# Patient Record
Sex: MALE | Race: WHITE | Employment: STUDENT | URBAN - METROPOLITAN AREA
[De-identification: names, ages, dates, MRNs, and addresses within clinical notes are randomized per-mention and may not be internally consistent; named-entity substitution may affect disease eponyms.]

---

## 2018-05-08 ENCOUNTER — HOSPITAL ENCOUNTER (OUTPATIENT)
Dept: SURGERY | Age: 18
Discharge: HOME | End: 2018-05-08

## 2018-05-09 VITALS — HEIGHT: 69 IN | WEIGHT: 150 LBS | BODY MASS INDEX: 22.22 KG/M2

## 2018-05-09 NOTE — PERIOP NOTES
Patient verified name, , and surgery as listed in [facility]. Type 1B surgery, phone assessment complete. Orders not received. Labs per surgeon: no orders in EMR at this time  Labs per anesthesia protocol: none    Pt reports hx of heart murmur diagnosis by [facility] clinic approx 2 yrs ago. Pt states he does not have a PCP nor did he ever follow-up with this possible diagnosis. Will have anesthesia review per protocol. Patient answered medical/surgical history questions at their best of ability. All prior to admission medications documented in [facility]. Patient instructed to take the following medications the day of surgery according to anesthesia guidelines with a small sip of water: none. Hold all vitamins 7 days prior to surgery and NSAIDS 5 days prior to surgery. Medications to be held- nsaids and vitamins. Patient instructed on the following (verbal instructions given as well as emailed instructions per pt request):  Arrive at A Entrance, time of arrival to be called the day before by 1700. NPO after midnight including gum, mints, and ice chips. Responsible adult must drive patient to the hospital, stay during surgery, and patient will need supervision 24 hours after anesthesia. Use antibacterial soap in shower the night before surgery and on the morning of surgery. Leave all valuables (money and jewelry) at home but bring insurance card and ID on DOS. Do not wear make-up, nail polish, lotions, cologne, perfumes, powders, or oil on skin. Patient teach back successful and patient demonstrates knowledge of instruction.

## 2018-05-13 ENCOUNTER — ANESTHESIA EVENT (OUTPATIENT)
Dept: SURGERY | Age: 18
End: 2018-05-13
Payer: COMMERCIAL

## 2018-05-14 ENCOUNTER — ANESTHESIA (OUTPATIENT)
Dept: SURGERY | Age: 18
End: 2018-05-14
Payer: COMMERCIAL

## 2018-05-14 ENCOUNTER — HOSPITAL ENCOUNTER (OUTPATIENT)
Age: 18
Setting detail: OUTPATIENT SURGERY
Discharge: HOME | End: 2018-05-14
Attending: OTOLARYNGOLOGY | Admitting: OTOLARYNGOLOGY
Payer: COMMERCIAL

## 2018-05-14 VITALS
HEART RATE: 50 BPM | TEMPERATURE: 97.6 F | OXYGEN SATURATION: 99 % | RESPIRATION RATE: 17 BRPM | DIASTOLIC BLOOD PRESSURE: 65 MMHG | SYSTOLIC BLOOD PRESSURE: 122 MMHG

## 2018-05-14 PROCEDURE — 74011250637 HC RX REV CODE- 250/637: Performed by: ANESTHESIOLOGY

## 2018-05-14 PROCEDURE — 77030018836 HC SOL IRR NACL ICUM -A: Performed by: OTOLARYNGOLOGY

## 2018-05-14 PROCEDURE — 77030008477 HC STYL SATN SLP COVD -A: Performed by: NURSE ANESTHETIST, CERTIFIED REGISTERED

## 2018-05-14 PROCEDURE — 77030012840 HC ELECTRD COAG SUC CNMD -C: Performed by: OTOLARYNGOLOGY

## 2018-05-14 PROCEDURE — 74011250636 HC RX REV CODE- 250/636

## 2018-05-14 PROCEDURE — 74011000250 HC RX REV CODE- 250: Performed by: ANESTHESIOLOGY

## 2018-05-14 PROCEDURE — 74011250636 HC RX REV CODE- 250/636: Performed by: ANESTHESIOLOGY

## 2018-05-14 PROCEDURE — 77030011267 HC ELECTRD BLD COVD -A: Performed by: OTOLARYNGOLOGY

## 2018-05-14 PROCEDURE — 77030020782 HC GWN BAIR PAWS FLX 3M -B: Performed by: NURSE ANESTHETIST, CERTIFIED REGISTERED

## 2018-05-14 PROCEDURE — 76060000032 HC ANESTHESIA 0.5 TO 1 HR: Performed by: OTOLARYNGOLOGY

## 2018-05-14 PROCEDURE — 77030008703 HC TU ET UNCUF COVD -A: Performed by: NURSE ANESTHETIST, CERTIFIED REGISTERED

## 2018-05-14 PROCEDURE — 77030011640 HC PAD GRND REM COVD -A: Performed by: OTOLARYNGOLOGY

## 2018-05-14 PROCEDURE — 76010000138 HC OR TIME 0.5 TO 1 HR: Performed by: OTOLARYNGOLOGY

## 2018-05-14 PROCEDURE — 74011000250 HC RX REV CODE- 250

## 2018-05-14 PROCEDURE — 76210000006 HC OR PH I REC 0.5 TO 1 HR: Performed by: OTOLARYNGOLOGY

## 2018-05-14 RX ORDER — ROCURONIUM BROMIDE 10 MG/ML
INJECTION, SOLUTION INTRAVENOUS AS NEEDED
Status: DISCONTINUED | OUTPATIENT
Start: 2018-05-14 | End: 2018-05-14 | Stop reason: HOSPADM

## 2018-05-14 RX ORDER — OXYCODONE HYDROCHLORIDE 5 MG/1
5 TABLET ORAL
Status: DISCONTINUED | OUTPATIENT
Start: 2018-05-14 | End: 2018-05-14 | Stop reason: HOSPADM

## 2018-05-14 RX ORDER — HYDROMORPHONE HYDROCHLORIDE 2 MG/ML
0.5 INJECTION, SOLUTION INTRAMUSCULAR; INTRAVENOUS; SUBCUTANEOUS
Status: DISCONTINUED | OUTPATIENT
Start: 2018-05-14 | End: 2018-05-14 | Stop reason: HOSPADM

## 2018-05-14 RX ORDER — SODIUM CHLORIDE 9 MG/ML
25 INJECTION, SOLUTION INTRAVENOUS CONTINUOUS
Status: DISCONTINUED | OUTPATIENT
Start: 2018-05-14 | End: 2018-05-14 | Stop reason: HOSPADM

## 2018-05-14 RX ORDER — HYDROCODONE BITARTRATE AND ACETAMINOPHEN 7.5; 325 MG/1; MG/1
1 TABLET ORAL AS NEEDED
Status: DISCONTINUED | OUTPATIENT
Start: 2018-05-14 | End: 2018-05-14 | Stop reason: HOSPADM

## 2018-05-14 RX ORDER — MIDAZOLAM HYDROCHLORIDE 1 MG/ML
2 INJECTION, SOLUTION INTRAMUSCULAR; INTRAVENOUS
Status: DISCONTINUED | OUTPATIENT
Start: 2018-05-14 | End: 2018-05-14 | Stop reason: HOSPADM

## 2018-05-14 RX ORDER — SODIUM CHLORIDE 0.9 % (FLUSH) 0.9 %
5-10 SYRINGE (ML) INJECTION AS NEEDED
Status: DISCONTINUED | OUTPATIENT
Start: 2018-05-14 | End: 2018-05-14 | Stop reason: HOSPADM

## 2018-05-14 RX ORDER — ONDANSETRON 2 MG/ML
INJECTION INTRAMUSCULAR; INTRAVENOUS AS NEEDED
Status: DISCONTINUED | OUTPATIENT
Start: 2018-05-14 | End: 2018-05-14 | Stop reason: HOSPADM

## 2018-05-14 RX ORDER — LIDOCAINE HYDROCHLORIDE 20 MG/ML
INJECTION, SOLUTION EPIDURAL; INFILTRATION; INTRACAUDAL; PERINEURAL AS NEEDED
Status: DISCONTINUED | OUTPATIENT
Start: 2018-05-14 | End: 2018-05-14 | Stop reason: HOSPADM

## 2018-05-14 RX ORDER — LIDOCAINE HYDROCHLORIDE 10 MG/ML
0.1 INJECTION INFILTRATION; PERINEURAL AS NEEDED
Status: DISCONTINUED | OUTPATIENT
Start: 2018-05-14 | End: 2018-05-14 | Stop reason: HOSPADM

## 2018-05-14 RX ORDER — SODIUM CHLORIDE, SODIUM LACTATE, POTASSIUM CHLORIDE, CALCIUM CHLORIDE 600; 310; 30; 20 MG/100ML; MG/100ML; MG/100ML; MG/100ML
100 INJECTION, SOLUTION INTRAVENOUS CONTINUOUS
Status: DISCONTINUED | OUTPATIENT
Start: 2018-05-14 | End: 2018-05-14 | Stop reason: HOSPADM

## 2018-05-14 RX ORDER — NALOXONE HYDROCHLORIDE 0.4 MG/ML
0.1 INJECTION, SOLUTION INTRAMUSCULAR; INTRAVENOUS; SUBCUTANEOUS AS NEEDED
Status: DISCONTINUED | OUTPATIENT
Start: 2018-05-14 | End: 2018-05-14 | Stop reason: HOSPADM

## 2018-05-14 RX ORDER — FENTANYL CITRATE 50 UG/ML
INJECTION, SOLUTION INTRAMUSCULAR; INTRAVENOUS AS NEEDED
Status: DISCONTINUED | OUTPATIENT
Start: 2018-05-14 | End: 2018-05-14 | Stop reason: HOSPADM

## 2018-05-14 RX ORDER — SODIUM CHLORIDE 0.9 % (FLUSH) 0.9 %
5-10 SYRINGE (ML) INJECTION EVERY 8 HOURS
Status: DISCONTINUED | OUTPATIENT
Start: 2018-05-14 | End: 2018-05-14 | Stop reason: HOSPADM

## 2018-05-14 RX ORDER — FAMOTIDINE 20 MG/1
20 TABLET, FILM COATED ORAL ONCE
Status: COMPLETED | OUTPATIENT
Start: 2018-05-14 | End: 2018-05-14

## 2018-05-14 RX ORDER — SUCCINYLCHOLINE CHLORIDE 20 MG/ML
INJECTION INTRAMUSCULAR; INTRAVENOUS AS NEEDED
Status: DISCONTINUED | OUTPATIENT
Start: 2018-05-14 | End: 2018-05-14 | Stop reason: HOSPADM

## 2018-05-14 RX ORDER — DEXAMETHASONE SODIUM PHOSPHATE 4 MG/ML
INJECTION, SOLUTION INTRA-ARTICULAR; INTRALESIONAL; INTRAMUSCULAR; INTRAVENOUS; SOFT TISSUE AS NEEDED
Status: DISCONTINUED | OUTPATIENT
Start: 2018-05-14 | End: 2018-05-14 | Stop reason: HOSPADM

## 2018-05-14 RX ORDER — FENTANYL CITRATE 50 UG/ML
100 INJECTION, SOLUTION INTRAMUSCULAR; INTRAVENOUS ONCE
Status: DISCONTINUED | OUTPATIENT
Start: 2018-05-14 | End: 2018-05-14 | Stop reason: HOSPADM

## 2018-05-14 RX ORDER — PROPOFOL 10 MG/ML
INJECTION, EMULSION INTRAVENOUS AS NEEDED
Status: DISCONTINUED | OUTPATIENT
Start: 2018-05-14 | End: 2018-05-14 | Stop reason: HOSPADM

## 2018-05-14 RX ADMIN — ROCURONIUM BROMIDE 5 MG: 10 INJECTION, SOLUTION INTRAVENOUS at 13:13

## 2018-05-14 RX ADMIN — LIDOCAINE HYDROCHLORIDE 0.1 ML: 10 INJECTION, SOLUTION INFILTRATION; PERINEURAL at 11:55

## 2018-05-14 RX ADMIN — HYDROMORPHONE HYDROCHLORIDE 0.5 MG: 2 INJECTION, SOLUTION INTRAMUSCULAR; INTRAVENOUS; SUBCUTANEOUS at 14:14

## 2018-05-14 RX ADMIN — LIDOCAINE HYDROCHLORIDE 80 MG: 20 INJECTION, SOLUTION EPIDURAL; INFILTRATION; INTRACAUDAL; PERINEURAL at 13:13

## 2018-05-14 RX ADMIN — PROPOFOL 200 MG: 10 INJECTION, EMULSION INTRAVENOUS at 13:13

## 2018-05-14 RX ADMIN — FAMOTIDINE 20 MG: 20 TABLET ORAL at 11:55

## 2018-05-14 RX ADMIN — SUCCINYLCHOLINE CHLORIDE 140 MG: 20 INJECTION INTRAMUSCULAR; INTRAVENOUS at 13:13

## 2018-05-14 RX ADMIN — HYDROMORPHONE HYDROCHLORIDE 0.5 MG: 2 INJECTION, SOLUTION INTRAMUSCULAR; INTRAVENOUS; SUBCUTANEOUS at 14:00

## 2018-05-14 RX ADMIN — FENTANYL CITRATE 100 MCG: 50 INJECTION, SOLUTION INTRAMUSCULAR; INTRAVENOUS at 13:13

## 2018-05-14 RX ADMIN — DEXAMETHASONE SODIUM PHOSPHATE 10 MG: 4 INJECTION, SOLUTION INTRA-ARTICULAR; INTRALESIONAL; INTRAMUSCULAR; INTRAVENOUS; SOFT TISSUE at 13:29

## 2018-05-14 RX ADMIN — HYDROMORPHONE HYDROCHLORIDE 0.5 MG: 2 INJECTION, SOLUTION INTRAMUSCULAR; INTRAVENOUS; SUBCUTANEOUS at 13:51

## 2018-05-14 RX ADMIN — ONDANSETRON 4 MG: 2 INJECTION INTRAMUSCULAR; INTRAVENOUS at 13:29

## 2018-05-14 RX ADMIN — SODIUM CHLORIDE, SODIUM LACTATE, POTASSIUM CHLORIDE, AND CALCIUM CHLORIDE 100 ML/HR: 600; 310; 30; 20 INJECTION, SOLUTION INTRAVENOUS at 11:55

## 2018-05-14 NOTE — ANESTHESIA PREPROCEDURE EVALUATION
Anesthetic History               Review of Systems / Medical History  Patient summary reviewed    Pulmonary                   Neuro/Psych              Cardiovascular      Valvular problems/murmurs (? murmur in past)            Exercise tolerance: >4 METS     GI/Hepatic/Renal                Endo/Other             Other Findings              Physical Exam    Airway  Mallampati: II  TM Distance: > 6 cm  Neck ROM: normal range of motion   Mouth opening: Normal     Cardiovascular  Regular rate and rhythm,  S1 and S2 normal,  no murmur, click, rub, or gallop          Pertinent negatives: No murmur   Dental  No notable dental hx       Pulmonary  Breath sounds clear to auscultation               Abdominal         Other Findings            Anesthetic Plan    ASA: 1  Anesthesia type: general            Anesthetic plan and risks discussed with: Patient

## 2018-05-14 NOTE — IP AVS SNAPSHOT
Summary of Care Report The Summary of Care report has been created to help improve care coordination. Users with access to [employer] or [address] (Web-based application) may access additional patient information including the Discharge Summary. If you are not currently a [address] user and need more information, please call the number listed below in the [place] [address] section and ask to be connected with Medical Records. Facility Information Name Address Phone [id number] [place] [address] Road [address] [place] [address] [phone] Patient Information Patient Name Sex BELKIS Maza ([id number]) Male 2000 Discharge Information Admitting Provider Service Area Unit Vernon Medeiros DO / [facility] / [phone] Discharge Provider Discharge Date/Time Discharge Disposition Destination (none) (none) (none) (none) Patient Language Language ENGLISH [13] Hospital Problems as of 2018  Reviewed: 2018 12:30 PM by Vernon Medeiros DO None Non-Hospital Problems as of 2018  Reviewed: 2018 12:30 PM by Vernon Medeiros DO Class Noted - Resolved Last Modified Active Problems Tonsillitis, chronic  Unknown - Present 10/17/2016 by Meryle Dupre Entered by Meryle Dupre Ill-defined condition  Unknown - Present 10/17/2016 by Meryle Dupre Entered by Meryle Dupre Overview Signed 10/17/2016 11:34 AM by Meryle Dupre  
   recurrent strep throat Hypertrophy of tonsil  Unknown - Present 10/17/2016 by Meryle Dupre Entered by Meryle Dupre Deviated septum  Unknown - Present 10/17/2016 by Meryle Dupre Entered by Meryle Dupre You are allergic to the following Allergen Reactions Cefdinir Rash Cephalexin Rash Not allergic to this. Current Discharge Medication List  
  
ASK your doctor about these medications Dose & Instructions Dispensing Information Comments HYDROcodone-acetaminophen 0.5-21.7 mg/mL oral solution Commonly known as:  HYCET Take 2-4 teaspoons every 4-6 hours prn pain Quantity:  240 mL Refills:  0  
   
 ondansetron 4 mg disintegrating tablet Commonly known as:  ZOFRAN ODT Dose:  4 mg Take 1 Tab by mouth every eight (8) hours as needed for Nausea. Quantity:  8 Tab Refills:  0  
   
 prednisoLONE 15 mg/5 mL (3 mg/mL) solution Commonly known as:  [address] Take 1 teaspoon every morning for 5 days Quantity:  35 mL Refills:  0 Surgery Information ID Date/Time Status Primary Surgeon All Procedures Location [id number] 5/14/2018 1250 Unposted Richie Charter Adolfo, DO TONSILLECTOMY [facility] MAIN OR Follow-up Information None Discharge Instructions Tonsillectomy/Adenoidectomy Post-op Teaching For Ages 9 and Above Following your child's tonsillectomy/adenoidectomy there are several things that often occur. For three to six days after this surgery, your child may seem a little worse each day. This is a common problem for children after this surgery. You will be giving them pain medicine but they just will not feel well and they will not want much to eat or drink. BLEEDING: If your child bleeds after the surgery, it is important that you contact Dr. Dre Marino immediately. Fortunately, only a small number of children do this/ If you do not get an immediate response, go to the emergency room. You do not need to do this  If it is just blood-streaked spit. The bleeding typically occurs seven to ten days after surgery. DRINK PLENTY OF FLUIDS: 
Your child may become mildly dehydrated.  Your goal is to make sure that this dehydration does not become serious enough that the child needs to have medical attention. Continuously offer your child small frequent sips of beverages. This will not be easy because will not feel like eating or drinking. If you think your child is not getting enough liquid, please call our office. DIET: 
Your child will heal faster with good nutrition. Crunchy foods such as chips, popcorn, nuts, hard candy, etc. should be avoided for two weeks after surgery/ If your child only had an adenoidectomy, there is no diet restriction. FEVER: 
More than likely there will be a postoperative fever. It is not unusual for a child to run a 101 or even a 102 fever for one to three days after the surgery. The Leellen Part has half of the normal dose of Tylenol so you can give half the child's usual dose of liquid Tylenol with the Norco if there is a fever after surgery. BAD BREATH: 
Your child is going to have bad breath for 7-10 days after surgery. The healing membrane over the operative area has a very strong odor. If you look in the back of the throat you will see this and it looks very much like a bad case of strep-throat, but it is not an infection. EAR PAIN: 
Your child may develop ear pain. In some children, the ear pain can be quite uncomfortable. This is a referred pain from having the tonsils removed and it is not a sign of an ear infection. TONGUE SWELLING: 
Your child will experience some tongue swelling after the surgery. This is a side effect of the instrument that we use to hold the tongue out of the way during surgery. This is not a serious problem and goes away in a few days. GUM CHEWING: 
This stretches the area where the tonsils or adenoids were removed and some children have less pain with gum chewing. ASPIRIN: 
DO NOT give your child ANY ASPIRIN for at least 3 weeks after surgery. ACTIVITY: 
Have your child remain less active for two weeks after surgery. Avoid rough play, P.E., or sports. After general anesthesia or intravenous sedation, for 24 hours or while taking prescription Narcotics: · Limit your activities · Do not drive and operate hazardous machinery · Do not make important personal or business decisions · Do  not drink alcoholic beverages · If you have not urinated within 8 hours after discharge, please contact your surgeon on call. *  Please give a list of your current medications to your Primary Care Provider. *  Please update this list whenever your medications are discontinued, doses are 
    changed, or new medications (including over-the-counter products) are added. *  Please carry medication information at all times in case of emergency situations. These are general instructions for a healthy lifestyle: No smoking/ No tobacco products/ Avoid exposure to second hand smoke Surgeon General's Warning:  Quitting smoking now greatly reduces serious risk to your health. Obesity, smoking, and sedentary lifestyle greatly increases your risk for illness A healthy diet, regular physical exercise & weight monitoring are important for maintaining a healthy lifestyle You may be retaining fluid if you have a history of heart failure or if you experience any of the following symptoms:  Weight gain of 3 pounds or more overnight or 5 pounds in a week, increased swelling in our hands or feet or shortness of breath while lying flat in bed. Please call your doctor as soon as you notice any of these symptoms; do not wait until your next office visit. Recognize signs and symptoms of STROKE: 
F-face looks uneven A-arms unable to move or move unevenly S-speech slurred or non-existent T-time-call 911 as soon as signs and symptoms begin-DO NOT go Back to bed or wait to see if you get better-TIME IS BRAIN. Chart Review Routing History No Routing History on File

## 2018-05-14 NOTE — DISCHARGE INSTRUCTIONS
Tonsillectomy/Adenoidectomy Post-op Teaching  For Ages 9 and Above    Following your child's tonsillectomy/adenoidectomy there are several things that often occur. For three to six days after this surgery, your child may seem a little worse each day. This is a common problem for children after this surgery. You will be giving them pain medicine but they just will not feel well and they will not want much to eat or drink. BLEEDING:    If your child bleeds after the surgery, it is important that you contact Dr. Jg Monge immediately. Fortunately, only a small number of children do this/ If you do not get an immediate response, go to the emergency room. You do not need to do this  If it is just blood-streaked spit. The bleeding typically occurs seven to ten days after surgery. DRINK PLENTY OF FLUIDS:  Your child may become mildly dehydrated. Your goal is to make sure that this dehydration does not become serious enough that the child needs to have medical attention. Continuously offer your child small frequent sips of beverages. This will not be easy because will not feel like eating or drinking. If you think your child is not getting enough liquid, please call our office. DIET:  Your child will heal faster with good nutrition. Crunchy foods such as chips, popcorn, nuts, hard candy, etc. should be avoided for two weeks after surgery/ If your child only had an adenoidectomy, there is no diet restriction. FEVER:  More than likely there will be a postoperative fever. It is not unusual for a child to run a 101 or even a 102 fever for one to three days after the surgery. The [place] Snellen has half of the normal dose of Tylenol so you can give half the child's usual dose of liquid Tylenol with the Norco if there is a fever after surgery. BAD BREATH:  Your child is going to have bad breath for 7-10 days after surgery. The healing membrane over the operative area has a very strong odor.  If you look in the back of the throat you will see this and it looks very much like a bad case of strep-throat, but it is not an infection. EAR PAIN:  Your child may develop ear pain. In some children, the ear pain can be quite uncomfortable. This is a referred pain from having the tonsils removed and it is not a sign of an ear infection. TONGUE SWELLING:  Your child will experience some tongue swelling after the surgery. This is a side effect of the instrument that we use to hold the tongue out of the way during surgery. This is not a serious problem and goes away in a few days. GUM CHEWING:  This stretches the area where the tonsils or adenoids were removed and some children have less pain with gum chewing. ASPIRIN:  DO NOT give your child ANY ASPIRIN for at least 3 weeks after surgery. ACTIVITY:  Have your child remain less active for two weeks after surgery. Avoid rough play, P.E., or sports. After general anesthesia or intravenous sedation, for 24 hours or while taking prescription Narcotics:  · Limit your activities  · Do not drive and operate hazardous machinery  · Do not make important personal or business decisions  · Do  not drink alcoholic beverages  · If you have not urinated within 8 hours after discharge, please contact your surgeon on call. *  Please give a list of your current medications to your Primary Care Provider. *  Please update this list whenever your medications are discontinued, doses are      changed, or new medications (including over-the-counter products) are added. *  Please carry medication information at all times in case of emergency situations. These are general instructions for a healthy lifestyle:  No smoking/ No tobacco products/ Avoid exposure to second hand smoke  Surgeon General's Warning:  Quitting smoking now greatly reduces serious risk to your health.   Obesity, smoking, and sedentary lifestyle greatly increases your risk for illness  A healthy diet, regular physical exercise & weight monitoring are important for maintaining a healthy lifestyle    You may be retaining fluid if you have a history of heart failure or if you experience any of the following symptoms:  Weight gain of 3 pounds or more overnight or 5 pounds in a week, increased swelling in our hands or feet or shortness of breath while lying flat in bed. Please call your doctor as soon as you notice any of these symptoms; do not wait until your next office visit. Recognize signs and symptoms of STROKE:  F-face looks uneven  A-arms unable to move or move unevenly  S-speech slurred or non-existent  T-time-call 911 as soon as signs and symptoms begin-DO NOT go       Back to bed or wait to see if you get better-TIME IS BRAIN.

## 2018-05-14 NOTE — ANESTHESIA POSTPROCEDURE EVALUATION
Post-Anesthesia Evaluation and Assessment    Patient: Brian Dumont MRN: [id number]  SSN: [id number]    YOB: 2000  Age: 21 y.o. Sex: male       Cardiovascular Function/Vital Signs  Visit Vitals    /70 (BP 1 Location: Left arm, BP Patient Position: Head of bed elevated (Comment degrees))    Pulse (!) 55    Temp 36.4 °C (97.6 °F)    Resp 16    SpO2 97%       Patient is status post general anesthesia for Procedure(s):  TONSILLECTOMY. Nausea/Vomiting: None    Postoperative hydration reviewed and adequate. Pain:  Pain Scale 1: Numeric (0 - 10) (05/14/18 1414)  Pain Intensity 1: 5 (05/14/18 1414)   Managed    Neurological Status:   Neuro (WDL): Exceptions to WDL (05/14/18 1344)  Neuro  Orientation Level: Oriented to person;Oriented to place;Oriented to situation (05/14/18 1414)  Cognition: Decreased attention/concentration; Follows commands (05/14/18 1414)  Speech: Clear (05/14/18 1354)  LUE Motor Response: Purposeful (05/14/18 1349)  LLE Motor Response: Purposeful (05/14/18 1349)  RUE Motor Response: Purposeful (05/14/18 1349)  RLE Motor Response: Purposeful (05/14/18 1349)   At baseline    Mental Status and Level of Consciousness: Arousable    Pulmonary Status:   O2 Device: Room air (05/14/18 1414)   Adequate oxygenation and airway patent    Complications related to anesthesia: None    Post-anesthesia assessment completed.  No concerns    Signed By: Eder Franklin MD     May 14, 2018

## 2018-05-14 NOTE — IP AVS SNAPSHOT
[facility] 
 
 
 [address] [facility] [address] 
[phone] Patient: Gisel Smith MRN: [id number] XEN:[id number] About your hospitalization You were admitted on:  May 14, 2018 You last received care in the:  [facility] PACU You were discharged on:  May 14, 2018 Why you were hospitalized Your primary diagnosis was:  Not on File Follow-up Information None Your Scheduled Appointments Wednesday May 23, 2018 12:30 PM EDT  
POST OP with DO GRECIA Beauchamp ENT 40 [facility]) [address] [facility] [address] [place] [address]  [id number] [phone] Discharge Orders None A check scar indicates which time of day the medication should be taken. My Medications ASK your doctor about these medications Instructions Each Dose to Equal  
 Morning Noon Evening Bedtime HYDROcodone-acetaminophen 0.5-21.7 mg/mL oral solution Commonly known as:  HYCET Your last dose was: Your next dose is: Take 2-4 teaspoons every 4-6 hours prn pain  
     
   
   
   
  
 ondansetron 4 mg disintegrating tablet Commonly known as:  ZOFRAN ODT Your last dose was: Your next dose is: Take 1 Tab by mouth every eight (8) hours as needed for Nausea. 4 mg  
    
   
   
   
  
 prednisoLONE 15 mg/5 mL (3 mg/mL) solution Commonly known as:  Verdia Huddle Your last dose was: Your next dose is: Take 1 teaspoon every morning for 5 days Opioid Education Prescription Opioids: What You Need to Know: 
 
Prescription opioids can be used to help relieve moderate-to-severe pain and are often prescribed following a surgery or injury, or for certain health conditions. These medications can be an important part of treatment but also come with serious risks.   Opioids are strong pain medicines. Examples include hydrocodone, oxycodone, fentanyl, and morphine. Heroin is an example of an illegal opioid. It is important to work with your health care provider to make sure you are getting the safest, most effective care. WHAT ARE THE RISKS AND SIDE EFFECTS OF OPIOID USE? Prescription opioids carry serious risks of addiction and overdose, especially with prolonged use. An opioid overdose, often marked by slow breathing, can cause sudden death. The use of prescription opioids can have a number of side effects as well, even when taken as directed. · Tolerance-meaning you might need to take more of a medication for the same pain relief · Physical dependence-meaning you have symptoms of withdrawal when the medication is stopped. Withdrawal symptoms can include nausea, sweating, chills, diarrhea, stomach cramps, and muscle aches. Withdrawal can last up to several weeks, depending on which drug you took and how long you took it. · Increased sensitivity to pain · Constipation · Nausea, vomiting, and dry mouth · Sleepiness and dizziness · Confusion · Depression · Low levels of testosterone that can result in lower sex drive, energy, and strength · Itching and sweating RISKS ARE GREATER WITH:      
· History of drug misuse, substance use disorder, or overdose · Mental health conditions (such as depression or anxiety) · Sleep apnea · Older age (72 years or older) · Pregnancy Avoid alcohol while taking prescription opioids. Also, unless specifically advised by your health care provider, medications to avoid include: · Benzodiazepines (such as Xanax or Valium) · Muscle relaxants (such as Soma or Flexeril) · Hypnotics (such as Ambien or Lunesta) · Other prescription opioids KNOW YOUR OPTIONS Talk to your health care provider about ways to manage your pain that don't involve prescription opioids.   Some of these options may actually work better and have fewer risks and side effects. Options may include: 
· Pain relievers such as acetaminophen, ibuprofen, and naproxen · Some medications that are also used for depression or seizures · Physical therapy and exercise · Counseling to help patients learn how to cope better with triggers of pain and stress. · Application of heat or cold compress · Massage therapy · Relaxation techniques Be Informed Make sure you know the name of your medication, how much and how often to take it, and its potential risks & side effects. IF YOU ARE PRESCRIBED OPIOIDS FOR PAIN: 
· Never take opioids in greater amounts or more often than prescribed. Remember the goal is not to be pain-free but to manage your pain at a tolerable level. · Follow up with your primary care provider to: · Work together to create a plan on how to manage your pain. · Talk about ways to help manage your pain that don't involve prescription opioids. · Talk about any and all concerns and side effects. · Help prevent misuse and abuse. · Never sell or share prescription opioids · Help prevent misuse and abuse. · Store prescription opioids in a secure place and out of reach of others (this may include visitors, children, friends, and family). · Safely dispose of unused/unwanted prescription opioids: Find your community drug take-back program or your pharmacy mail-back program, or flush them down the toilet, following guidance from the Food and Drug Administration (www.fda.gov/Drugs/ResourcesForYou). · Visit www.cdc.gov/drugoverdose to learn about the risks of opioid abuse and overdose. · If you believe you may be struggling with addiction, tell your health care provider and ask for guidance or call [employer] at 7-824-577-EXHH. Discharge Instructions Tonsillectomy/Adenoidectomy Post-op Teaching For Ages 9 and Above Following your child's tonsillectomy/adenoidectomy there are several things that often occur. For three to six days after this surgery, your child may seem a little worse each day. This is a common problem for children after this surgery. You will be giving them pain medicine but they just will not feel well and they will not want much to eat or drink. BLEEDING: If your child bleeds after the surgery, it is important that you contact Dr. Kiana Pruitt immediately. Fortunately, only a small number of children do this/ If you do not get an immediate response, go to the emergency room. You do not need to do this  If it is just blood-streaked spit. The bleeding typically occurs seven to ten days after surgery. DRINK PLENTY OF FLUIDS: 
Your child may become mildly dehydrated. Your goal is to make sure that this dehydration does not become serious enough that the child needs to have medical attention. Continuously offer your child small frequent sips of beverages. This will not be easy because will not feel like eating or drinking. If you think your child is not getting enough liquid, please call our office. DIET: 
Your child will heal faster with good nutrition. Crunchy foods such as chips, popcorn, nuts, hard candy, etc. should be avoided for two weeks after surgery/ If your child only had an adenoidectomy, there is no diet restriction. FEVER: 
More than likely there will be a postoperative fever. It is not unusual for a child to run a 101 or even a 102 fever for one to three days after the surgery. The Sonja Flores has half of the normal dose of Tylenol so you can give half the child's usual dose of liquid Tylenol with the Norco if there is a fever after surgery. BAD BREATH: 
Your child is going to have bad breath for 7-10 days after surgery. The healing membrane over the operative area has a very strong odor.  If you look in the back of the throat you will see this and it looks very much like a bad case of strep-throat, but it is not an infection. EAR PAIN: 
Your child may develop ear pain. In some children, the ear pain can be quite uncomfortable. This is a referred pain from having the tonsils removed and it is not a sign of an ear infection. TONGUE SWELLING: 
Your child will experience some tongue swelling after the surgery. This is a side effect of the instrument that we use to hold the tongue out of the way during surgery. This is not a serious problem and goes away in a few days. GUM CHEWING: 
This stretches the area where the tonsils or adenoids were removed and some children have less pain with gum chewing. ASPIRIN: 
DO NOT give your child ANY ASPIRIN for at least 3 weeks after surgery. ACTIVITY: 
Have your child remain less active for two weeks after surgery. Avoid rough play, P.E., or sports. After general anesthesia or intravenous sedation, for 24 hours or while taking prescription Narcotics: · Limit your activities · Do not drive and operate hazardous machinery · Do not make important personal or business decisions · Do  not drink alcoholic beverages · If you have not urinated within 8 hours after discharge, please contact your surgeon on call. *  Please give a list of your current medications to your Primary Care Provider. *  Please update this list whenever your medications are discontinued, doses are 
    changed, or new medications (including over-the-counter products) are added. *  Please carry medication information at all times in case of emergency situations. These are general instructions for a healthy lifestyle: No smoking/ No tobacco products/ Avoid exposure to second hand smoke Surgeon General's Warning:  Quitting smoking now greatly reduces serious risk to your health. Obesity, smoking, and sedentary lifestyle greatly increases your risk for illness A healthy diet, regular physical exercise & weight monitoring are important for maintaining a healthy lifestyle You may be retaining fluid if you have a history of heart failure or if you experience any of the following symptoms:  Weight gain of 3 pounds or more overnight or 5 pounds in a week, increased swelling in our hands or feet or shortness of breath while lying flat in bed. Please call your doctor as soon as you notice any of these symptoms; do not wait until your next office visit. Recognize signs and symptoms of STROKE: 
F-face looks uneven A-arms unable to move or move unevenly S-speech slurred or non-existent T-time-call 911 as soon as signs and symptoms begin-DO NOT go Back to bed or wait to see if you get better-TIME IS BRAIN. Introducing [facility]! [employer] introduces [employer] patient portal. Now you can access parts of your medical record, email your doctor's office, and request medication refills online. 1. In your internet browser, go to [email or web address] 2. Click on the First Time User? Click Here link in the Sign In box. You will see the New Member Sign Up page. 3. Enter your Funidelia Access Code exactly as it appears below. You will not need to use this code after youve completed the sign-up process. If you do not sign up before the expiration date, you must request a new code. · Funidelia Access Code: [email or web address] Expires: 7/3/2018 11:23 AM 
 
4. Enter the last four digits of your Social Security Number (xxxx) and Date of Birth (mm/dd/yyyy) as indicated and click Submit. You will be taken to the next sign-up page. 5. Create a Funidelia ID. This will be your Funidelia login ID and cannot be changed, so think of one that is secure and easy to remember. 6. Create a Funidelia password. You can change your password at any time. 7. Enter your Password Reset Question and Answer. This can be used at a later time if you forget your password. 8. Enter your e-mail address. You will receive e-mail notification when new information is available in [address] 9. Click Sign Up. You can now view and download portions of your medical record. 10. Click the Download Summary menu link to download a portable copy of your medical information. If you have questions, please visit the Frequently Asked Questions section of the [employer] website. Remember, globa.ly is NOT to be used for urgent needs. For medical emergencies, dial 911. Now available from your iPhone and Android! Introducing Patrice Healy As a [employer] patient, I wanted to make you aware of our electronic visit tool called Patrice Healy. [employer] 24/7 allows you to connect within minutes with a medical provider 24 hours a day, seven days a week via a mobile device or tablet or logging into a secure website from your computer. You can access Patrice Healy from anywhere in the [place]. A virtual visit might be right for you when you have a simple condition and feel like you just dont want to get out of bed, or cant get away from work for an appointment, when your regular [employer] provider is not available (evenings, weekends or holidays), or when youre out of town and need minor care. Electronic visits cost only $49 and if the [employer] provider determines a prescription is needed to treat your condition, one can be electronically transmitted to a nearby pharmacy*. Please take a moment to enroll today if you have not already done so. The enrollment process is free and takes just a few minutes. To enroll, please download the [employer] magali to your tablet or phone, or visit [email or web address] to enroll on your computer.    
And, as an [address] [place] [address] patient with a [employer] account, the results of your visits will be scanned into your electronic medical record and your primary care provider will be able to view the scanned results. We urge you to continue to see your regular [facility] provider for your ongoing medical care. And while your primary care provider may not be the one available when you seek a [facility]rice [employer] virtual visit, the peace of mind you get from getting a real diagnosis real time can be priceless. For more information on [employer], view our Frequently Asked Questions (FAQs) at [email or web address]. Sincerely, 
 
Sandra Carver MD 
Chief Medical Officer [facility] *:  certain medications cannot be prescribed via Patrice Julioacostafin Providers Seen During Your Hospitalization Provider Specialty Primary office phone Edna Norris DO Otolaryngology [phone] Your Primary Care Physician (PCP) Primary Care Physician Office Phone Office Fax NONE ** None ** ** None ** You are allergic to the following Allergen Reactions Cefdinir Rash Cephalexin Rash Not allergic to this. Recent Documentation Smoking Status Never Smoker Emergency Contacts Name Discharge Info Relation Home Work Mobile [address] CAREGIVER [3] Father [15] [phone] Patient Belongings The following personal items are in your possession at time of discharge: 
  Dental Appliances: Other (comment) (permanent upper retainer)         Home Medications: None   Jewelry: None  Clothing: Footwear, Shorts, Shirt    Other Valuables: None, Medialive Please provide this summary of care documentation to your next provider. Signatures-by signing, you are acknowledging that this After Visit Summary has been reviewed with you and you have received a copy. Patient Signature:  ____________________________________________________________ Date:  ____________________________________________________________  
  
Cielo Torres  Provider Signature: ____________________________________________________________ Date:  ____________________________________________________________

## 2018-05-14 NOTE — OP NOTES
[facility] REPORT    Ge Portal  MR#: [id number]  : 2000  ACCOUNT #: [de-identified]   DATE OF SERVICE: 2018    PREOPERATIVE DIAGNOSIS:  Chronic tonsillitis and tonsillar hypertrophy. POSTOPERATIVE DIAGNOSIS:  Chronic tonsillitis and tonsillar hypertrophy. PROCEDURE PERFORMED:  Tonsillectomy. SURGEON:  Saira Haywood DO    ASSISTANT:  None. ANESTHESIA:  General.    COMPLICATIONS:  None. ESTIMATED BLOOD LOSS:  Less than 5 mL. HISTORY:  A 28-year-old young man who came to see me in the office because of recurrent strep throat. He saw me 2 years ago, and at that point, I did recommend he undergo a tonsillectomy due to recurrent strep throat, but after I saw him then he did okay for about a year and a half, having no strep throat infections, but over the last 6 months he has had strep throat 6 times again. He is having sore throats, nasal congestion, he has been on multiple antibiotics. No other complaints. Physical examination revealed 3+ cryptic tonsils, with no sign of active infection, so it was my recommendation he undergo a tonsillectomy. Procedure, risks and benefits were discussed with him in the office. All questions were answered, and he is agreeable to the surgery. DESCRIPTION OF PROCEDURE:  The patient was identified in the preoperative waiting area, he was taken back to the operating room, where he underwent general anesthesia. The bed was turned 90 degrees. A Bandar-Rashaad mouth gag was inserted and opened. A curved Allis was used to medialize the left tonsil, which was removed using the Bovie, and the tonsillar fossa was cauterized using suction cautery. There was no bleeding from the left. Same thing was done, the right.   A curved Allis was used to medialize the right tonsil, which was removed using the Bovie, and the tonsillar fossa was cauterized using suction cautery, and there was only mild bleeding from the right side. So, once there was good hemostasis and the Bandar-Rashaad mouth gag was released and removed, the patient was awakened. He was taken to postop recovery room in stable condition.       DO ANA ROSA Velasquez / GIO  D: 05/14/2018 13:37     T: 05/14/2018 14:37  JOB #: [id number]

## (undated) DEVICE — MEDI-VAC YANKAUER SUCTION HANDLE W/BULBOUS TIP: Brand: CARDINAL HEALTH

## (undated) DEVICE — REM POLYHESIVE ADULT PATIENT RETURN ELECTRODE: Brand: VALLEYLAB

## (undated) DEVICE — INSULATED BLADE ELECTRODE: Brand: EDGE

## (undated) DEVICE — 2000CC GUARDIAN II: Brand: GUARDIAN

## (undated) DEVICE — SPONGE: SPECIALTY TONSIL XR MED 100/CS: Brand: MEDICAL ACTION INDUSTRIES

## (undated) DEVICE — BUTTON SWITCH PENCIL BLADE ELECTRODE, HOLSTER: Brand: EDGE

## (undated) DEVICE — ELECTROSURGICAL SUCTION COAGULATOR 10FR

## (undated) DEVICE — T AND A ORAL: Brand: MEDLINE INDUSTRIES, INC.

## (undated) DEVICE — SOL IRRIGATION INJ NACL 0.9% 500ML BTL

## (undated) DEVICE — SOL ANTI-FOG 6ML MEDC -- MEDICHOICE - CONVERT TO 358427